# Patient Record
Sex: MALE | Race: OTHER | HISPANIC OR LATINO
[De-identification: names, ages, dates, MRNs, and addresses within clinical notes are randomized per-mention and may not be internally consistent; named-entity substitution may affect disease eponyms.]

---

## 2022-08-24 ENCOUNTER — APPOINTMENT (OUTPATIENT)
Dept: UROLOGY | Facility: CLINIC | Age: 62
End: 2022-08-24

## 2022-08-24 VITALS
DIASTOLIC BLOOD PRESSURE: 83 MMHG | SYSTOLIC BLOOD PRESSURE: 135 MMHG | BODY MASS INDEX: 33.07 KG/M2 | WEIGHT: 231 LBS | TEMPERATURE: 97.3 F | HEART RATE: 81 BPM | OXYGEN SATURATION: 99 % | HEIGHT: 70 IN

## 2022-08-24 DIAGNOSIS — Z78.9 OTHER SPECIFIED HEALTH STATUS: ICD-10-CM

## 2022-08-24 PROBLEM — Z00.00 ENCOUNTER FOR PREVENTIVE HEALTH EXAMINATION: Status: ACTIVE | Noted: 2022-08-24

## 2022-08-24 PROCEDURE — 99204 OFFICE O/P NEW MOD 45 MIN: CPT

## 2022-08-25 NOTE — HISTORY OF PRESENT ILLNESS
[FreeTextEntry1] : Very pleasant 62 yoM presents after a recent episode of urinary retention.  Patient reports that he went to [facility] with complaints of difficulty voiding.  He was found to be in urinary retention and a Quintero catheter was placed there.  He reports pain with the catheter.  He denies dysuria.  No hematuria.  No flank pain.  He reports no episodes of urinary retention in the past.  No specific timing to his symptoms.  No aggravating or alleviating factors that he knows of.\par \par After catheter placement PSA was noted to be 5.03.\par Creatinine 1.17\par Urinalysis demonstrates greater than 60 red blood cells per high-power field after catheter placement

## 2022-08-25 NOTE — PHYSICAL EXAM
[General Appearance - Well Developed] : well developed [General Appearance - Well Nourished] : well nourished [Normal Appearance] : normal appearance [Well Groomed] : well groomed [General Appearance - In No Acute Distress] : no acute distress [Edema] : no peripheral edema [Respiration, Rhythm And Depth] : normal respiratory rhythm and effort [Exaggerated Use Of Accessory Muscles For Inspiration] : no accessory muscle use [Abdomen Soft] : soft [Abdomen Tenderness] : non-tender [Costovertebral Angle Tenderness] : no ~M costovertebral angle tenderness [Urethral Meatus] : meatus normal [Urinary Bladder Findings] : the bladder was normal on palpation [Scrotum] : the scrotum was normal [Testes Mass (___cm)] : there were no testicular masses [Normal Station and Gait] : the gait and station were normal for the patient's age [] : no rash [No Focal Deficits] : no focal deficits [Oriented To Time, Place, And Person] : oriented to person, place, and time [Affect] : the affect was normal [Mood] : the mood was normal [Not Anxious] : not anxious [No Palpable Adenopathy] : no palpable adenopathy [FreeTextEntry1] : Quintero catheter with clear yellow urine

## 2022-08-25 NOTE — ASSESSMENT
[FreeTextEntry1] : Very pleasant 62-year-old gentleman who presents for evaluation of BPH with urinary obstruction, urinary retention\par -Records from [facility] reviewed\par -Creatinine 1.17\par -PSA 5 0.03\par -Urinalysis demonstrates greater than 60 red blood cells per high-powered field\par -Discussed the natural history of BPH, as well as typical symptoms of an enlarged prostate. Discussed potential complications that could arise from BPH, including but not limited to urinary tract infections, bladder stones, and renal impairment.\par -Trial of Flomax\par -I discussed the risks, benefits, alternatives, and possible side effects of alpha blocker therapy with the patient, including but not limited to dizziness, lightheadedness, headache, blurred vision, retrograde ejaculation, and priapism with the patient. I also discussed that the patient must inform his ophthalmologist that he has taken an alpha blocker prior to undergoing cataract or glaucoma surgery.\par -Start finasteride\par -Follow-up in 2 days in the morning for trial of void

## 2022-08-25 NOTE — ASSESSMENT
[FreeTextEntry1] : Very pleasant 62-year-old gentleman who presents for evaluation of BPH with urinary obstruction, urinary retention\par -Records from [facility] reviewed\par -Creatinine 1.17\par -PSA 5 0.03\par -Urinalysis demonstrates greater than 60 red blood cells per high-powered field\par -Discussed the natural history of BPH, as well as typical symptoms of an enlarged prostate. Discussed potential complications that could arise from BPH, including but not limited to urinary tract infections, bladder stones, and renal impairment.\par -Trial of Flomax\par -I discussed the risks, benefits, alternatives, and possible side effects of alpha blocker therapy with the patient, including but not limited to dizziness, lightheadedness, headache, blurred vision, retrograde ejaculation, and priapism with the patient. I also discussed that the patient must inform his ophthalmologist that he has taken an alpha blocker prior to undergoing cataract or glaucoma surgery.\par -Start finasteride\par -Follow-up in 2 days in the morning for trial of void

## 2022-08-25 NOTE — REVIEW OF SYSTEMS
[see HPI] : see HPI [Negative] : Heme/Lymph [FreeTextEntry2] : elevated PSA / catheter placed in hospital  8/13/22

## 2022-08-25 NOTE — HISTORY OF PRESENT ILLNESS
[FreeTextEntry1] : Very pleasant 62 yoM presents after a recent episode of urinary retention.  Patient reports that he went to [facility] with complaints of difficulty voiding.  He was found to be in urinary retention and a Quintero catheter was placed there.  He reports pain with the catheter.  He denies dysuria.  No hematuria.  No flank pain.  He reports no episodes of urinary retention in the past.  No specific timing to his symptoms.  No aggravating or alleviating factors that he knows of.\par \par After catheter placement PSA was noted to be 5.03.\par Creatinine 1.17\par Urinalysis demonstrates greater than 60 red blood cells per high-power field after catheter placement

## 2022-08-26 ENCOUNTER — APPOINTMENT (OUTPATIENT)
Dept: UROLOGY | Facility: CLINIC | Age: 62
End: 2022-08-26
Payer: COMMERCIAL

## 2022-08-26 VITALS
SYSTOLIC BLOOD PRESSURE: 145 MMHG | HEIGHT: 70 IN | DIASTOLIC BLOOD PRESSURE: 88 MMHG | OXYGEN SATURATION: 99 % | WEIGHT: 231 LBS | HEART RATE: 80 BPM | BODY MASS INDEX: 33.07 KG/M2 | TEMPERATURE: 97.1 F

## 2022-08-26 PROCEDURE — 51700 IRRIGATION OF BLADDER: CPT

## 2022-08-26 PROCEDURE — 99214 OFFICE O/P EST MOD 30 MIN: CPT | Mod: 25

## 2022-08-26 PROCEDURE — 99213 OFFICE O/P EST LOW 20 MIN: CPT | Mod: 25

## 2022-08-26 PROCEDURE — A4216: CPT | Mod: NC

## 2022-08-26 RX ORDER — FINASTERIDE 1 MG/1
TABLET ORAL
Refills: 0 | Status: ACTIVE | COMMUNITY

## 2022-08-26 NOTE — PHYSICAL EXAM
[General Appearance - Well Developed] : well developed [General Appearance - Well Nourished] : well nourished [Normal Appearance] : normal appearance [Well Groomed] : well groomed [General Appearance - In No Acute Distress] : no acute distress [Abdomen Soft] : soft [Abdomen Tenderness] : non-tender [Costovertebral Angle Tenderness] : no ~M costovertebral angle tenderness [Urethral Meatus] : meatus normal [Urinary Bladder Findings] : the bladder was normal on palpation [Scrotum] : the scrotum was normal [Testes Mass (___cm)] : there were no testicular masses [FreeTextEntry1] : Quintero catheter with clear yellow urine [Edema] : no peripheral edema [] : no respiratory distress [Respiration, Rhythm And Depth] : normal respiratory rhythm and effort [Exaggerated Use Of Accessory Muscles For Inspiration] : no accessory muscle use [Oriented To Time, Place, And Person] : oriented to person, place, and time [Affect] : the affect was normal [Mood] : the mood was normal [Not Anxious] : not anxious [Normal Station and Gait] : the gait and station were normal for the patient's age [No Focal Deficits] : no focal deficits [No Palpable Adenopathy] : no palpable adenopathy

## 2022-08-26 NOTE — HISTORY OF PRESENT ILLNESS
[FreeTextEntry1] : Very pleasant 62-year-old gentleman who presents for follow-up of BPH and urinary retention.  Patient reports that he went to [facility] with complaints of difficulty voiding.  He was found to be in urinary retention and a Quintero catheter was placed there.  He reports pain with the catheter.  He denies dysuria.  No hematuria.  No flank pain.  He reports no episodes of urinary retention in the past.  No specific timing to his symptoms.  No aggravating or alleviating factors that he knows of.\par \par After catheter placement PSA was noted to be 5.03.\par Creatinine 1.17\par Urinalysis demonstrates greater than 60 red blood cells per high-power field after catheter placement

## 2022-08-26 NOTE — ASSESSMENT
[FreeTextEntry1] : Very pleasant 62-year-old gentleman who presents for follow-up of BPH, urinary retention, elevated PSA\par -PSA 5.03\par -Urinalysis demonstrates 60 red blood cells per high-powered field\par -Creatinine 1.17\par -Trial of void performed in the office today.  Patient was able to urinate with a strong stream of urine and to completion\par -Continue tamsulosin\par -Continue finasteride\par -Follow-up next week for PVR\par -Repeat PSA in 1 month

## 2022-08-30 ENCOUNTER — APPOINTMENT (OUTPATIENT)
Dept: UROLOGY | Facility: CLINIC | Age: 62
End: 2022-08-30

## 2022-08-30 VITALS
HEART RATE: 67 BPM | TEMPERATURE: 97.4 F | BODY MASS INDEX: 33.07 KG/M2 | HEIGHT: 70 IN | SYSTOLIC BLOOD PRESSURE: 132 MMHG | WEIGHT: 231 LBS | DIASTOLIC BLOOD PRESSURE: 80 MMHG

## 2022-08-30 PROCEDURE — 99213 OFFICE O/P EST LOW 20 MIN: CPT

## 2022-08-30 NOTE — ASSESSMENT
[FreeTextEntry1] : Mr. Gonzalez is a very pleasant 62 year old man here today for history of BPH and urinary retention.\par He reports feeling well since he was here last.\par Reports that he has a slow and weak stream when he initially urinates in the morning but otherwise is voiding well.  He reports that his urinary stream is significantly stronger during the day.\par Continue tamsulosin, however increased the dose to 0.4 mg twice daily\par Continue finasteride.\par RTO in 3 months.

## 2022-08-30 NOTE — HISTORY OF PRESENT ILLNESS
[Currently Experiencing ___] :  [unfilled] [Weak Stream] : weak stream [None] : None [FreeTextEntry1] : Mr. Gonzalez is a very pleasant 62 year old man here today for history of BPH and urinary retention.\par He reports feeling well since he was here last.\par Reports that he has a slow and weak stream when he initially urinates in the morning but otherwise is voiding well.\trudy Continues to take tamsulosin and finasteride daily.

## 2022-09-02 ENCOUNTER — APPOINTMENT (OUTPATIENT)
Dept: UROLOGY | Facility: CLINIC | Age: 62
End: 2022-09-02

## 2022-11-30 ENCOUNTER — APPOINTMENT (OUTPATIENT)
Dept: UROLOGY | Facility: CLINIC | Age: 62
End: 2022-11-30

## 2022-11-30 VITALS
DIASTOLIC BLOOD PRESSURE: 83 MMHG | SYSTOLIC BLOOD PRESSURE: 132 MMHG | TEMPERATURE: 98.5 F | OXYGEN SATURATION: 99 % | BODY MASS INDEX: 32.93 KG/M2 | WEIGHT: 230 LBS | HEART RATE: 78 BPM | HEIGHT: 70 IN

## 2022-11-30 PROCEDURE — 99214 OFFICE O/P EST MOD 30 MIN: CPT

## 2022-11-30 RX ORDER — TAMSULOSIN HYDROCHLORIDE 0.4 MG/1
0.4 CAPSULE ORAL
Qty: 90 | Refills: 1 | Status: DISCONTINUED | COMMUNITY
Start: 1900-01-01 | End: 2022-11-30

## 2022-11-30 NOTE — ASSESSMENT
[FreeTextEntry1] : Very pleasant 62-year-old gentleman who presents for follow-up of BPH with urinary obstruction, urinary retention, elevated PSA\par -We discussed the potential etiologies of an elevated PSA, including but not limited to prostate cancer, urinary tract infections, prostatitis, BPH, and recent ejaculation.\par -Repeat PSA today\par -Continue finasteride\par -Stop tamsulosin\par -Trial of alfuzosin 10 mg given persistent bothersome urinary symptoms\par -Follow-up in 3 months if PSA returns to normal given correction for finasteride

## 2022-11-30 NOTE — HISTORY OF PRESENT ILLNESS
[FreeTextEntry1] : Very pleasant 62-year-old gentleman presents for follow-up of BPH with urinary obstruction, history of urinary retention, elevated PSA.  He reports that he continues to urinate well after Quintero catheter was removed.  He continues to take tamsulosin daily and finasteride daily.  He still reports a slow urinary stream in the morning.  He denies dysuria.  No hematuria.  No flank pain or suprapubic pain.  He underwent PSA testing in the hospital after Quintero catheter placement which was found to be 5.03.  No other complaints.

## 2022-12-01 LAB — PSA SERPL-MCNC: 3.14 NG/ML

## 2022-12-09 ENCOUNTER — APPOINTMENT (OUTPATIENT)
Dept: MRI IMAGING | Facility: IMAGING CENTER | Age: 62
End: 2022-12-09

## 2023-01-17 ENCOUNTER — APPOINTMENT (OUTPATIENT)
Dept: UROLOGY | Facility: CLINIC | Age: 63
End: 2023-01-17
Payer: COMMERCIAL

## 2023-01-17 VITALS
SYSTOLIC BLOOD PRESSURE: 130 MMHG | DIASTOLIC BLOOD PRESSURE: 82 MMHG | TEMPERATURE: 97.6 F | OXYGEN SATURATION: 98 % | BODY MASS INDEX: 33.21 KG/M2 | HEIGHT: 70 IN | WEIGHT: 232 LBS | HEART RATE: 74 BPM

## 2023-01-17 PROCEDURE — 99214 OFFICE O/P EST MOD 30 MIN: CPT

## 2023-01-17 NOTE — ASSESSMENT
[FreeTextEntry1] : Mr. Gonzalez is a very pleasant 62 year old man here today for history of BPH and elevated PSA.\par He reports feeling well with no real complaints.\par Denies any hematuria, dysuria or urinary retention.\par Continues to take tamsulosin and finasteride daily.\par PSA 11/30/22 3.14 on finasteride.\par Prostate MRI PIRADS 3, 99.8 cc prostate.\par Continue tamsulosin.\par Continue finasteride.\par RTO 3 months PSA.

## 2023-01-17 NOTE — HISTORY OF PRESENT ILLNESS
[None] : None [FreeTextEntry1] : Mr. Gonzalez is a very pleasant 62 year old man here today for history of BPH and elevated PSA.\par He reports feeling well with no real complaints.\par Denies any hematuria, dysuria or urinary retention.\par Continues to take tamsulosin and finasteride daily.

## 2023-04-18 ENCOUNTER — APPOINTMENT (OUTPATIENT)
Dept: UROLOGY | Facility: CLINIC | Age: 63
End: 2023-04-18
Payer: COMMERCIAL

## 2023-04-18 VITALS
SYSTOLIC BLOOD PRESSURE: 134 MMHG | HEART RATE: 88 BPM | HEIGHT: 70 IN | TEMPERATURE: 98.4 F | OXYGEN SATURATION: 98 % | BODY MASS INDEX: 33.21 KG/M2 | DIASTOLIC BLOOD PRESSURE: 89 MMHG | WEIGHT: 232 LBS

## 2023-04-18 PROCEDURE — 99214 OFFICE O/P EST MOD 30 MIN: CPT

## 2023-04-18 NOTE — ASSESSMENT
[FreeTextEntry1] : Mr. Gonzalez is a very pleasant 62 year old man here today for history of BPH, elevated PSA\par He reports feeling well with no real complaints.\par Denies any hematuria or dysuria.\par Continues to take alfuzosin and finasteride daily.\par reports he is happy with his urination at this time.\par Continue alfuzosin - refills sent to pharmacy.\par Continue finasteride - refills sent to pharmacy.\par PSA from 10/22 3.14 on finasteride.\par Prostate MRI images from 10/22 reviewed demonstrating an overall suspicion score of PIRADS 2 in the setting of a 99 cc prostate.\par PSA today\par RTO in 6 months if PSA stable.

## 2023-04-18 NOTE — HISTORY OF PRESENT ILLNESS
[None] : None [FreeTextEntry1] : Mr. Gonzalez is a very pleasant 62 year old man here today for history of BPH.\par He reports feeling well with no real complaints.\par Denies any hematuria or dysuria.\trudy Continues to take alfuzosin and finasteride daily.\par reports he is happy with his urination at this time.

## 2023-04-19 LAB — PSA SERPL-MCNC: 3.18 NG/ML

## 2023-10-18 ENCOUNTER — APPOINTMENT (OUTPATIENT)
Dept: UROLOGY | Facility: CLINIC | Age: 63
End: 2023-10-18
Payer: COMMERCIAL

## 2023-10-18 VITALS
WEIGHT: 238 LBS | SYSTOLIC BLOOD PRESSURE: 151 MMHG | DIASTOLIC BLOOD PRESSURE: 81 MMHG | TEMPERATURE: 97.9 F | OXYGEN SATURATION: 98 % | HEIGHT: 70 IN | BODY MASS INDEX: 34.07 KG/M2 | HEART RATE: 81 BPM

## 2023-10-18 DIAGNOSIS — R33.9 RETENTION OF URINE, UNSPECIFIED: ICD-10-CM

## 2023-10-18 PROCEDURE — 99214 OFFICE O/P EST MOD 30 MIN: CPT

## 2023-10-18 RX ORDER — TADALAFIL 5 MG/1
5 TABLET ORAL
Qty: 30 | Refills: 1 | Status: ACTIVE | COMMUNITY
Start: 2023-10-18 | End: 1900-01-01

## 2023-10-19 LAB — PSA SERPL-MCNC: 2.98 NG/ML

## 2023-11-15 ENCOUNTER — APPOINTMENT (OUTPATIENT)
Dept: UROLOGY | Facility: CLINIC | Age: 63
End: 2023-11-15
Payer: COMMERCIAL

## 2023-11-15 VITALS
BODY MASS INDEX: 34.07 KG/M2 | WEIGHT: 238 LBS | SYSTOLIC BLOOD PRESSURE: 138 MMHG | HEIGHT: 70 IN | HEART RATE: 81 BPM | OXYGEN SATURATION: 97 % | DIASTOLIC BLOOD PRESSURE: 85 MMHG | TEMPERATURE: 97.3 F

## 2023-11-15 DIAGNOSIS — N52.9 MALE ERECTILE DYSFUNCTION, UNSPECIFIED: ICD-10-CM

## 2023-11-15 PROCEDURE — 99213 OFFICE O/P EST LOW 20 MIN: CPT

## 2024-05-15 ENCOUNTER — APPOINTMENT (OUTPATIENT)
Dept: UROLOGY | Facility: CLINIC | Age: 64
End: 2024-05-15
Payer: COMMERCIAL

## 2024-05-15 VITALS
SYSTOLIC BLOOD PRESSURE: 150 MMHG | DIASTOLIC BLOOD PRESSURE: 80 MMHG | HEART RATE: 88 BPM | BODY MASS INDEX: 34.07 KG/M2 | TEMPERATURE: 97.9 F | HEIGHT: 70 IN | WEIGHT: 238 LBS | OXYGEN SATURATION: 98 %

## 2024-05-15 DIAGNOSIS — N13.8 BENIGN PROSTATIC HYPERPLASIA WITH LOWER URINARY TRACT SYMPMS: ICD-10-CM

## 2024-05-15 DIAGNOSIS — N40.1 BENIGN PROSTATIC HYPERPLASIA WITH LOWER URINARY TRACT SYMPMS: ICD-10-CM

## 2024-05-15 DIAGNOSIS — R97.20 ELEVATED PROSTATE, SPECIFIC ANTIGEN [PSA]: ICD-10-CM

## 2024-05-15 PROCEDURE — 99214 OFFICE O/P EST MOD 30 MIN: CPT

## 2024-05-15 PROCEDURE — G2211 COMPLEX E/M VISIT ADD ON: CPT

## 2024-05-15 RX ORDER — TAMSULOSIN HYDROCHLORIDE 0.4 MG/1
0.4 CAPSULE ORAL
Qty: 90 | Refills: 3 | Status: ACTIVE | COMMUNITY
Start: 2024-05-15 | End: 1900-01-01

## 2024-05-15 RX ORDER — ALFUZOSIN HYDROCHLORIDE 10 MG/1
10 TABLET, EXTENDED RELEASE ORAL DAILY
Qty: 90 | Refills: 1 | Status: DISCONTINUED | COMMUNITY
Start: 2022-11-30 | End: 2024-05-15

## 2024-05-15 RX ORDER — FINASTERIDE 5 MG/1
5 TABLET, FILM COATED ORAL DAILY
Qty: 90 | Refills: 3 | Status: ACTIVE | COMMUNITY
Start: 2022-08-26 | End: 1900-01-01

## 2024-05-15 NOTE — ASSESSMENT
[FreeTextEntry1] : Mr. Gonzalez is a very pleasant 63 year old man here today for BPH and prostate cancer screening. He reports feeling well since he was here last. He reports he continues to take tamsulosin and finasteride daily. He is currently happy with his urination at this time. Nocturia x2-3 that is not bothersome. He has not yet tried tadalafil and has no real urge to try it at this point. Denies any hematuria or dysuria. PSA 10/2023 2.98. Prostate MRI 01/2023 PIRADS 2 setting of 99.8 cc prostate. PSA today BMP today A1c Continue tamsulosin - refills sent to pharmacy. Continue finasteride - refills sent to pharmacy. RTO in 6 months.  Patient is being seen today for evaluation and management of a chronic and longitudinal ongoing condition and I am of the primary treating physician

## 2024-05-15 NOTE — HISTORY OF PRESENT ILLNESS
[None] : None [FreeTextEntry1] : Mr. Gonzalez is a very pleasant 63 year old man here today for BPH and prostate cancer screening. He reports feeling well since he was here last. He reports he continues to take tamsulosin and finasteride daily. He is currently happy with his urination at this time. Nocturia x2-3 that is not bothersome. He has not yet tried tadalafil and has no real urge to try it at this point. Denies any hematuria or dysuria.

## 2024-05-16 LAB
ANION GAP SERPL CALC-SCNC: 10 MMOL/L
BUN SERPL-MCNC: 15 MG/DL
CALCIUM SERPL-MCNC: 9.7 MG/DL
CHLORIDE SERPL-SCNC: 103 MMOL/L
CO2 SERPL-SCNC: 26 MMOL/L
CREAT SERPL-MCNC: 1.13 MG/DL
EGFR: 74 ML/MIN/1.73M2
ESTIMATED AVERAGE GLUCOSE: 120 MG/DL
GLUCOSE SERPL-MCNC: 91 MG/DL
HBA1C MFR BLD HPLC: 5.8 %
POTASSIUM SERPL-SCNC: 4.8 MMOL/L
PSA SERPL-MCNC: 3.07 NG/ML
SODIUM SERPL-SCNC: 139 MMOL/L

## 2024-11-19 ENCOUNTER — APPOINTMENT (OUTPATIENT)
Dept: UROLOGY | Facility: CLINIC | Age: 64
End: 2024-11-19
Payer: COMMERCIAL

## 2024-11-19 VITALS
HEIGHT: 70 IN | OXYGEN SATURATION: 98 % | TEMPERATURE: 96.4 F | WEIGHT: 232 LBS | HEART RATE: 71 BPM | SYSTOLIC BLOOD PRESSURE: 129 MMHG | DIASTOLIC BLOOD PRESSURE: 84 MMHG | BODY MASS INDEX: 33.21 KG/M2

## 2024-11-19 DIAGNOSIS — R97.20 ELEVATED PROSTATE, SPECIFIC ANTIGEN [PSA]: ICD-10-CM

## 2024-11-19 DIAGNOSIS — N40.1 BENIGN PROSTATIC HYPERPLASIA WITH LOWER URINARY TRACT SYMPMS: ICD-10-CM

## 2024-11-19 DIAGNOSIS — N13.8 BENIGN PROSTATIC HYPERPLASIA WITH LOWER URINARY TRACT SYMPMS: ICD-10-CM

## 2024-11-19 DIAGNOSIS — Z78.9 OTHER SPECIFIED HEALTH STATUS: ICD-10-CM

## 2024-11-19 PROCEDURE — G2211 COMPLEX E/M VISIT ADD ON: CPT | Mod: NC

## 2024-11-19 PROCEDURE — 99214 OFFICE O/P EST MOD 30 MIN: CPT

## 2025-05-20 ENCOUNTER — APPOINTMENT (OUTPATIENT)
Dept: UROLOGY | Facility: CLINIC | Age: 65
End: 2025-05-20
Payer: COMMERCIAL

## 2025-05-20 VITALS
WEIGHT: 232 LBS | BODY MASS INDEX: 33.21 KG/M2 | TEMPERATURE: 96.8 F | HEART RATE: 80 BPM | OXYGEN SATURATION: 98 % | HEIGHT: 70 IN | SYSTOLIC BLOOD PRESSURE: 132 MMHG | DIASTOLIC BLOOD PRESSURE: 60 MMHG

## 2025-05-20 DIAGNOSIS — R97.20 ELEVATED PROSTATE, SPECIFIC ANTIGEN [PSA]: ICD-10-CM

## 2025-05-20 DIAGNOSIS — N13.8 BENIGN PROSTATIC HYPERPLASIA WITH LOWER URINARY TRACT SYMPMS: ICD-10-CM

## 2025-05-20 DIAGNOSIS — N40.1 BENIGN PROSTATIC HYPERPLASIA WITH LOWER URINARY TRACT SYMPMS: ICD-10-CM

## 2025-05-20 DIAGNOSIS — N52.9 MALE ERECTILE DYSFUNCTION, UNSPECIFIED: ICD-10-CM

## 2025-05-20 LAB
ANION GAP SERPL CALC-SCNC: 12 MMOL/L
BUN SERPL-MCNC: 18 MG/DL
CALCIUM SERPL-MCNC: 9.8 MG/DL
CHLORIDE SERPL-SCNC: 105 MMOL/L
CO2 SERPL-SCNC: 26 MMOL/L
CREAT SERPL-MCNC: 1.27 MG/DL
EGFRCR SERPLBLD CKD-EPI 2021: 64 ML/MIN/1.73M2
ESTIMATED AVERAGE GLUCOSE: 120 MG/DL
GLUCOSE SERPL-MCNC: 105 MG/DL
HBA1C MFR BLD HPLC: 5.8 %
POTASSIUM SERPL-SCNC: 5.2 MMOL/L
PSA SERPL-MCNC: 3.12 NG/ML
SODIUM SERPL-SCNC: 143 MMOL/L

## 2025-05-20 PROCEDURE — G2211 COMPLEX E/M VISIT ADD ON: CPT | Mod: NC

## 2025-05-20 PROCEDURE — 99214 OFFICE O/P EST MOD 30 MIN: CPT

## 2025-05-20 RX ORDER — FINASTERIDE 5 MG/1
5 TABLET, FILM COATED ORAL DAILY
Qty: 90 | Refills: 1 | Status: ACTIVE | COMMUNITY
Start: 2025-05-20 | End: 1900-01-01